# Patient Record
Sex: FEMALE
[De-identification: names, ages, dates, MRNs, and addresses within clinical notes are randomized per-mention and may not be internally consistent; named-entity substitution may affect disease eponyms.]

---

## 2020-01-24 ENCOUNTER — HOSPITAL ENCOUNTER (EMERGENCY)
Dept: HOSPITAL 5 - ED | Age: 55
Discharge: HOME | End: 2020-01-24
Payer: COMMERCIAL

## 2020-01-24 VITALS — SYSTOLIC BLOOD PRESSURE: 155 MMHG | DIASTOLIC BLOOD PRESSURE: 97 MMHG
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
## 2020-01-24 DIAGNOSIS — S43.491A: ICD-10-CM

## 2020-01-24 DIAGNOSIS — S16.1XXA: Primary | ICD-10-CM
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
## 2020-01-24 DIAGNOSIS — I10: ICD-10-CM

## 2020-01-24 DIAGNOSIS — S09.90XA: ICD-10-CM
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
## 2020-01-24 DIAGNOSIS — E11.9: ICD-10-CM
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
## 2020-01-24 LAB
BENZODIAZEPINES SCREEN,URINE: (no result)
METHADONE SCREEN,URINE: (no result)
OPIATE SCREEN,URINE: (no result)

## 2020-01-24 PROCEDURE — 72040 X-RAY EXAM NECK SPINE 2-3 VW: CPT

## 2020-01-24 PROCEDURE — 80307 DRUG TEST PRSMV CHEM ANLYZR: CPT
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
## 2020-01-24 NOTE — XRAY REPORT
CERVICAL SPINE 3 VIEWS



INDICATION:  pain.



COMPARISON: None.



IMPRESSION:  Normal alignment.  Mild discogenic DJD and facet arthropathy are identified at C6-7 and 
C7-T1.  No acute osseous or soft tissue abnormality.  







RIGHT SHOULDER 3 VIEWS



INDICATION:  pain. 



COMPARISON: None.



IMPRESSION:  No acute osseous or soft tissue abnormality.    No significant DJD.



Signer Name: Anmol Avendaño Jr, MD 

Signed: 1/24/2020 2:56 PM

 Workstation Name: [id number]
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
## 2020-01-24 NOTE — XRAY REPORT
CERVICAL SPINE 3 VIEWS



INDICATION:  pain.



COMPARISON: None.



IMPRESSION:  Normal alignment.  Mild discogenic DJD and facet arthropathy are identified at C6-7 and 
C7-T1.  No acute osseous or soft tissue abnormality.  







RIGHT SHOULDER 3 VIEWS



INDICATION:  pain. 



COMPARISON: None.



IMPRESSION:  No acute osseous or soft tissue abnormality.    No significant DJD.



Signer Name: Anmol Avendaño Jr, MD 

Signed: 1/24/2020 2:56 PM

 Workstation Name: [id number]